# Patient Record
Sex: MALE | Race: OTHER | NOT HISPANIC OR LATINO | URBAN - METROPOLITAN AREA
[De-identification: names, ages, dates, MRNs, and addresses within clinical notes are randomized per-mention and may not be internally consistent; named-entity substitution may affect disease eponyms.]

---

## 2024-08-17 ENCOUNTER — EMERGENCY (EMERGENCY)
Facility: HOSPITAL | Age: 54
LOS: 1 days | Discharge: HOME | End: 2024-08-17
Attending: EMERGENCY MEDICINE | Admitting: EMERGENCY MEDICINE
Payer: COMMERCIAL

## 2024-08-17 VITALS
HEART RATE: 90 BPM | TEMPERATURE: 98 F | SYSTOLIC BLOOD PRESSURE: 99 MMHG | RESPIRATION RATE: 19 BRPM | DIASTOLIC BLOOD PRESSURE: 66 MMHG | OXYGEN SATURATION: 99 %

## 2024-08-17 VITALS
DIASTOLIC BLOOD PRESSURE: 66 MMHG | RESPIRATION RATE: 18 BRPM | TEMPERATURE: 99 F | HEART RATE: 71 BPM | SYSTOLIC BLOOD PRESSURE: 100 MMHG | OXYGEN SATURATION: 98 %

## 2024-08-17 DIAGNOSIS — M79.604 PAIN IN RIGHT LEG: ICD-10-CM
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
## 2024-08-17 DIAGNOSIS — Z87.81 PERSONAL HISTORY OF (HEALED) TRAUMATIC FRACTURE: ICD-10-CM

## 2024-08-17 DIAGNOSIS — M25.511 PAIN IN RIGHT SHOULDER: ICD-10-CM

## 2024-08-17 DIAGNOSIS — G89.11 ACUTE PAIN DUE TO TRAUMA: ICD-10-CM

## 2024-08-17 DIAGNOSIS — Z91.013 ALLERGY TO SEAFOOD: ICD-10-CM
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
## 2024-08-17 PROCEDURE — 73030 X-RAY EXAM OF SHOULDER: CPT | Mod: 26,RT

## 2024-08-17 PROCEDURE — 99284 EMERGENCY DEPT VISIT MOD MDM: CPT

## 2024-08-17 PROCEDURE — 73030 X-RAY EXAM OF SHOULDER: CPT

## 2024-08-17 PROCEDURE — 96372 THER/PROPH/DIAG INJ SC/IM: CPT

## 2024-08-17 PROCEDURE — 70450 CT HEAD/BRAIN W/O DYE: CPT | Mod: 26,MC

## 2024-08-17 PROCEDURE — 99284 EMERGENCY DEPT VISIT MOD MDM: CPT | Mod: 25

## 2024-08-17 PROCEDURE — 70450 CT HEAD/BRAIN W/O DYE: CPT | Mod: MC

## 2024-08-17 PROCEDURE — 73562 X-RAY EXAM OF KNEE 3: CPT

## 2024-08-17 PROCEDURE — 73502 X-RAY EXAM HIP UNI 2-3 VIEWS: CPT

## 2024-08-17 PROCEDURE — 36000 PLACE NEEDLE IN VEIN: CPT

## 2024-08-17 PROCEDURE — 73562 X-RAY EXAM OF KNEE 3: CPT | Mod: 26,RT

## 2024-08-17 PROCEDURE — 73502 X-RAY EXAM HIP UNI 2-3 VIEWS: CPT | Mod: 26,RT

## 2024-08-17 RX ORDER — KETOROLAC TROMETHAMINE 10 MG
15 TABLET ORAL ONCE
Refills: 0 | Status: DISCONTINUED | OUTPATIENT
Start: 2024-08-17 | End: 2024-08-17

## 2024-08-17 RX ORDER — ACETAMINOPHEN 500 MG
650 TABLET ORAL ONCE
Refills: 0 | Status: COMPLETED | OUTPATIENT
Start: 2024-08-17 | End: 2024-08-17

## 2024-08-17 RX ADMIN — Medication 650 MILLIGRAM(S): at 05:20

## 2024-08-17 RX ADMIN — Medication 15 MILLIGRAM(S): at 02:27

## 2024-08-17 NOTE — ED PROVIDER NOTE - PHYSICAL EXAMINATION
Vitals reviewed  Gen: comfortable appearing at rest, in nad, speaking in full sentences  Skin: wwp, no rash/lesions  HEENT: ncat, eomi, mmm  Neck/Back: no midline ttp/step off, no paraspinal ttp  CV: rrr, no audible m/r/g  Resp: symmetrical expansion, ctab, no w/r/r  Ext: mild ttp over R anterior AC joint and R lateral hip and patella w/ no significant swelling or deformity, FROM throughout, no peripheral edema, SILT equal throughout, distal pulses 2+  Neuro: alert/oriented x3, no focal deficits, steady gait without assistance

## 2024-08-17 NOTE — ED PROVIDER NOTE - WHICH SHOWED
R knee, R hip, R shoulder INTERPRETATION:  no acute fracture; no soft tissue swelling noted; normal bony alignment. possible mild AC separation

## 2024-08-17 NOTE — ED PROVIDER NOTE - CLINICAL SUMMARY MEDICAL DECISION MAKING FREE TEXT BOX
53 M pmh TBI, psych d/o, prior R knee injury and R shoulder dislocations in past here from inpt psych facility for R leg and R shoulder pain s/p mechanical fall in shower.  hit head but no loc.  on exam pt comfortable appearing, ncat, no midline spinal ttp, mild ttp over R anterior AC joint and R lateral hip and patella w/ no significant swelling or deformity, FROM throughout, no peripheral edema, SILT equal throughout, distal pulses 2+.  r/o ich and fractures R hip/knee/shoulder. NSAID for pain.    xrays w/ ? mild AC separation (known prior injury), no fx or dislocation. normal ct head.  recommend RICE and f/u with pmd/ortho.  discussed strict return parameters

## 2024-08-17 NOTE — ED PROVIDER NOTE - OBJECTIVE STATEMENT
53 M pmh TBI, psych d/o, prior R knee injury and R shoulder dislocations in past here from inpt psych facility for R leg and R shoulder pain s/p mechanical fall in shower.  pt reports slipped in the shower and fell onto R knee then caught himself w/ R arm and landed on L hip hitting head- no loc or use of AC.  was able to walk after w/o assistance.  did not take anything for pain.  denies f/c, HA, dizziness, neck/back pain, chest pain, sob, palpitations, abd pain, nv, numbness/weakness, paresthesias, limited ROM ext, or other injuries

## 2024-08-17 NOTE — ED PROVIDER NOTE - ATTENDING APP SHARED VISIT CONTRIBUTION OF CARE
Pt w PMHx TBI presents from inpatient psych facility c/o RLE and R shoulder pain s/p fall in the shower. Pt reports he slipped, fell forward, landed on R knee, caught himself w/ R hand, fell onto hip and hit his head. No LOC. No use of AC. Pt has ambulated since. Has not taken anything for pain. Felt shoulder dislocated and relocated (has also happened in the past). Former patella fx  Mild R hip and patella Pt w PMHx TBI presents from inpatient psych facility c/o RLE and R shoulder pain s/p fall in the shower. Pt reports he slipped, fell forward, landed on R knee, caught himself w/ R hand, fell onto hip and hit his head. No LOC. No use of AC. Pt has ambulated since. Has not taken anything for pain. Felt shoulder dislocated and relocated (has also happened in the past). Former patella fx  Constitutional: Well appearing, awake, alert, oriented to person, place, time/situation and in no apparent distress.  Head atraumatic, normocephalic  ENMT: Airway patent.   Eyes: Clear bilaterally  Cardiac: Normal rate, regular rhythm.   Respiratory: No increased WOB, tachypnea, hypoxia, or accessory mm use. Pt speaks in full sentences.   Musculoskeletal: Range of motion is not limited. no midline cervical spinal ttp. mild ttp over R anterior AC joint and R lateral hip and patella w/ no significant swelling or deformity, FROM throughout, NVI  Neuro: Alert and oriented x 3, face symmetric and speech fluent. Nml gross motor movement, grossly non focal   Skin: Skin normal color for race, warm, dry and intact. No evidence of rash.  Psych: Alert and oriented to person, place, time/situation. normal mood and affect. no apparent risk to self or others.  Witness mechanical fall. Neuro intact. CT / XR neg. d/w back to [facility]

## 2024-08-17 NOTE — ED PROVIDER NOTE - NSFOLLOWUPINSTRUCTIONS_ED_ALL_ED_FT
Your xrays show no acute fracture or dislocation and head CT shows no acute injury    Take tylenol 650mg or motrin 400-800mg as needed every 4-6 hours for pain.   REST- Rest your hurting/injured joint or extremity to decrease pain and swelling for 24-48 hours    ICE- Apply ice to area of pain to decreased inflammation and pain, put towel/barrier between ice and skin. You can keep ice on for 20 minutes at a time 4-8 times daily   COMPRESSION- Wear ace wrap or brace for support to reduce swelling.  Make sure not to wrap too tight, loosen if skin feeling numb/tingling or skin turns blue   ELEVATION- Elevate hurting/injured area 6 or more inches about level of heart to decrease swelling/inflammation.  Use pillow under joint to elevate area    [facility] (Thursday afternoons) - call [phone] to schedule    Please call to arrange follow up with primary care doctor within one week

## 2024-08-17 NOTE — ED PROVIDER NOTE - PATIENT PORTAL LINK FT
You can access the FollowMyHealth Patient Portal offered by [facility] by registering at the following website: [email or web address]. By joining [facility]’s FollowMyHealth portal, you will also be able to view your health information using other applications (apps) compatible with our system.